# Patient Record
Sex: MALE | Race: WHITE | URBAN - METROPOLITAN AREA
[De-identification: names, ages, dates, MRNs, and addresses within clinical notes are randomized per-mention and may not be internally consistent; named-entity substitution may affect disease eponyms.]

---
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
## 2025-01-09 ENCOUNTER — APPOINTMENT (OUTPATIENT)
Dept: URBAN - METROPOLITAN AREA CLINIC 333 | Facility: CLINIC | Age: 20
Setting detail: DERMATOLOGY
End: 2025-01-09

## 2025-01-09 DIAGNOSIS — L72.8 OTHER FOLLICULAR CYSTS OF THE SKIN AND SUBCUTANEOUS TISSUE: ICD-10-CM

## 2025-01-09 PROCEDURE — ? COUNSELING

## 2025-01-09 PROCEDURE — ? ADDITIONAL NOTES

## 2025-01-09 PROCEDURE — 99212 OFFICE O/P EST SF 10 MIN: CPT

## 2025-01-09 ASSESSMENT — LOCATION DETAILED DESCRIPTION DERM
LOCATION DETAILED: LEFT INFERIOR POSTERIOR NECK
LOCATION DETAILED: LEFT LATERAL ZYGOMA

## 2025-01-09 ASSESSMENT — LOCATION ZONE DERM
LOCATION ZONE: NECK
LOCATION ZONE: FACE

## 2025-01-09 ASSESSMENT — LOCATION SIMPLE DESCRIPTION DERM
LOCATION SIMPLE: LEFT ZYGOMA
LOCATION SIMPLE: POSTERIOR NECK

## 2025-03-12 ENCOUNTER — LAB OUTSIDE AN ENCOUNTER (OUTPATIENT)
Dept: URBAN - METROPOLITAN AREA CLINIC 60 | Facility: CLINIC | Age: 20
End: 2025-03-12

## 2025-03-12 ENCOUNTER — DASHBOARD ENCOUNTERS (OUTPATIENT)
Age: 20
End: 2025-03-12

## 2025-03-12 ENCOUNTER — OFFICE VISIT (OUTPATIENT)
Dept: URBAN - METROPOLITAN AREA CLINIC 60 | Facility: CLINIC | Age: 20
End: 2025-03-12
Payer: COMMERCIAL

## 2025-03-12 VITALS
SYSTOLIC BLOOD PRESSURE: 120 MMHG | DIASTOLIC BLOOD PRESSURE: 80 MMHG | WEIGHT: 159 LBS | HEIGHT: 70 IN | BODY MASS INDEX: 22.76 KG/M2 | HEART RATE: 87 BPM | OXYGEN SATURATION: 99 % | TEMPERATURE: 97.8 F

## 2025-03-12 DIAGNOSIS — R19.8 IRREGULAR BOWEL HABITS: ICD-10-CM

## 2025-03-12 DIAGNOSIS — R14.3 EXCESSIVE FLATUS: ICD-10-CM

## 2025-03-12 PROCEDURE — 99213 OFFICE O/P EST LOW 20 MIN: CPT

## 2025-03-12 NOTE — HPI-TODAY'S VISIT:
Patient is a 19-year-old male, new patient to Dr. Worthington, complaining of 1 to 2 months of excessive gas and irregular bowel habits. Denies past medical history. Not currently on any medications.   He is currently in college in [place] and down for spring break.  He states around 1-1/2 months ago he noticed he had an increase in the amount of gas he has in a day as well as some of his stools looked different shapes than they typically were.  He had 1 instance of abdominal pain that resolved after taking a "digestive shot" that had supplements and some turmeric in it.  Has not had any abdominal pain since.  Bowel movements are daily, formed, brown without any sign of bleeding or pain.  Denies fever, chills, nausea, vomiting, reflux or dysphagia.  Appetite and weight are stable.  He has been meeting with [employer] who has advised him to cut out various things from his diet.  He did try limiting lactose but did not do a complete lactose-free diet but did not notice much of a difference.  He additionally stopped his protein powders and supplements for a few weeks without much difference in symptoms.  No prior endoscopies.  Negative family history for GI malignancy.

## 2025-04-23 ENCOUNTER — TELEPHONE ENCOUNTER (OUTPATIENT)
Dept: URBAN - METROPOLITAN AREA CLINIC 60 | Facility: CLINIC | Age: 20
End: 2025-04-23

## 2025-05-06 ENCOUNTER — OFFICE VISIT (OUTPATIENT)
Dept: URBAN - METROPOLITAN AREA CLINIC 60 | Facility: CLINIC | Age: 20
End: 2025-05-06
Payer: COMMERCIAL

## 2025-05-06 DIAGNOSIS — R19.8 IRREGULAR BOWEL HABITS: ICD-10-CM

## 2025-05-06 DIAGNOSIS — K21.9 GERD WITHOUT ESOPHAGITIS: ICD-10-CM

## 2025-05-06 DIAGNOSIS — R14.3 EXCESSIVE FLATUS: ICD-10-CM

## 2025-05-06 PROBLEM — 266435005: Status: ACTIVE | Noted: 2025-05-06

## 2025-05-06 PROCEDURE — 99214 OFFICE O/P EST MOD 30 MIN: CPT

## 2025-05-06 RX ORDER — FAMOTIDINE 40 MG/1
1 TABLET TABLET, FILM COATED ORAL ONCE A DAY
Qty: 30 | Refills: 3 | OUTPATIENT
Start: 2025-05-06

## 2025-05-06 NOTE — HPI-PREVIOUS IMAGING
Right upper quadrant abdominal ultrasound 4/1/2025 noted liver grossly normal size and echotexture, small amount of gallbladder sludge, visualized portions of pancreas unremarkable

## 2025-05-06 NOTE — HPI-TODAY'S VISIT:
Patient coming in for labs and imaging follow-up visit.  We reviewed his labs and ultrasound which were unremarkable.  His previous symptom of excessive flatus has improved now typically having a bit of flatus prior to BM in the morning and then again at nighttime.  BMs are mostly regular and denies diarrhea or constipation.  No sign of GI bleeding.  New symptom of acid reflux over the past few weeks other associated symptoms including unintentional weight loss, nausea, vomiting, dysphagia.  Last OV 3/12/2025: Patient is a 19-year-old male, new patient to Dr. Worthington, complaining of 1 to 2 months of excessive gas and irregular bowel habits. Denies past medical history. Not currently on any medications.   He is currently in college in [place] and down for spring break.  He states around 1-1/2 months ago he noticed he had an increase in the amount of gas he has in a day as well as some of his stools looked different shapes than they typically were.  He had 1 instance of abdominal pain that resolved after taking a "digestive shot" that had supplements and some turmeric in it.  Has not had any abdominal pain since.  Bowel movements are daily, formed, brown without any sign of bleeding or pain.  Denies fever, chills, nausea, vomiting, reflux or dysphagia.  Appetite and weight are stable.  He has been meeting with [facility] who has advised him to cut out various things from his diet.  He did try limiting lactose but did not do a complete lactose-free diet but did not notice much of a difference.  He additionally stopped his protein powders and supplements for a few weeks without much difference in symptoms.  No prior endoscopies.  Negative family history for GI malignancy.

## 2025-05-21 ENCOUNTER — OFFICE VISIT (OUTPATIENT)
Dept: URBAN - METROPOLITAN AREA CLINIC 60 | Facility: CLINIC | Age: 20
End: 2025-05-21

## 2025-05-21 LAB — RESULT:: (no result)

## 2025-05-23 ENCOUNTER — TELEPHONE ENCOUNTER (OUTPATIENT)
Dept: URBAN - METROPOLITAN AREA CLINIC 60 | Facility: CLINIC | Age: 20
End: 2025-05-23

## 2025-06-06 ENCOUNTER — OFFICE VISIT (OUTPATIENT)
Dept: URBAN - METROPOLITAN AREA CLINIC 60 | Facility: CLINIC | Age: 20
End: 2025-06-06
Payer: COMMERCIAL

## 2025-06-06 DIAGNOSIS — R19.8 IRREGULAR BOWEL HABITS: ICD-10-CM

## 2025-06-06 DIAGNOSIS — K21.9 GERD WITHOUT ESOPHAGITIS: ICD-10-CM

## 2025-06-06 DIAGNOSIS — R14.3 EXCESSIVE FLATUS: ICD-10-CM

## 2025-06-06 PROCEDURE — 99214 OFFICE O/P EST MOD 30 MIN: CPT

## 2025-06-06 RX ORDER — FAMOTIDINE 40 MG/1
1 TABLET TABLET, FILM COATED ORAL ONCE A DAY
Qty: 30 | Refills: 3 | Status: ACTIVE | COMMUNITY
Start: 2025-05-06

## 2025-06-06 RX ORDER — OMEPRAZOLE 20 MG/1
1 CAPSULE 1/2 TO 1 HOUR BEFORE MORNING MEAL CAPSULE, DELAYED RELEASE ORAL ONCE A DAY
Qty: 30 | Refills: 3 | OUTPATIENT
Start: 2025-06-06

## 2025-06-06 NOTE — HPI-PREVIOUS LABS
Labs 5/21/2025 H. pylori negative  Labs 3/31/2025 Celiac serologies negative CBC unremarkable CMP unremarkable

## 2025-06-06 NOTE — HPI-TODAY'S VISIT:
Patient coming in for 6-week follow-up visit.  H. pylori stool antigen that was ordered at last visit negative.  He did not notice much of a difference with his belching symptoms on the famotidine 40 mg.  He reports he still having intermittent belching as well as feeling of regurgitation but without burning.  We will increase therapy to omeprazole 20 mg once daily.  He still continues to complain of excessive flatus before BM relieved with BM.  Occasional looser stool every few weeks.  He did previously try a lactose-free diet and felt that he did not notice much of a difference but does admit at today's visit that he had 2 episodes of the looser stools and on those days he had consumed cheesy items.  I advised him to start taking Lactaid to see if those improve his symptoms and keep a food diary.  He will additionally add fiber to his diet as he is a  and mostly consumes high-protein foods.  OV 5/2025: Patient coming in for labs and imaging follow-up visit.  We reviewed his labs and ultrasound which were unremarkable.  His previous symptom of excessive flatus has improved now typically having a bit of flatus prior to BM in the morning and then again at nighttime.  BMs are mostly regular and denies diarrhea or constipation.  No sign of GI bleeding.  New symptom of acid reflux over the past few weeks other associated symptoms including unintentional weight loss, nausea, vomiting, dysphagia.  Last OV 3/12/2025: Patient is a 19-year-old male, new patient to Dr. Worthington, complaining of 1 to 2 months of excessive gas and irregular bowel habits. Denies past medical history. Not currently on any medications.   He is currently in college in [place] and down for spring break.  He states around 1-1/2 months ago he noticed he had an increase in the amount of gas he has in a day as well as some of his stools looked different shapes than they typically were.  He had 1 instance of abdominal pain that resolved after taking a "digestive shot" that had supplements and some turmeric in it.  Has not had any abdominal pain since.  Bowel movements are daily, formed, brown without any sign of bleeding or pain.  Denies fever, chills, nausea, vomiting, reflux or dysphagia.  Appetite and weight are stable.  He has been meeting with [employer] who has advised him to cut out various things from his diet.  He did try limiting lactose but did not do a complete lactose-free diet but did not notice much of a difference.  He additionally stopped his protein powders and supplements for a few weeks without much difference in symptoms.  No prior endoscopies.  Negative family history for GI malignancy.

## 2025-07-14 ENCOUNTER — ERX REFILL RESPONSE (OUTPATIENT)
Dept: URBAN - METROPOLITAN AREA CLINIC 60 | Facility: CLINIC | Age: 20
End: 2025-07-14

## 2025-07-14 RX ORDER — FAMOTIDINE 40 MG/1
TAKE 1 TABLET BY MOUTH EVERY DAY TABLET ORAL
Qty: 90 TABLET | Refills: 2 | OUTPATIENT

## 2025-07-14 RX ORDER — FAMOTIDINE 40 MG/1
1 TABLET TABLET, FILM COATED ORAL ONCE A DAY
Qty: 30 | Refills: 3 | OUTPATIENT

## 2025-07-28 ENCOUNTER — OFFICE VISIT (OUTPATIENT)
Dept: URBAN - METROPOLITAN AREA CLINIC 60 | Facility: CLINIC | Age: 20
End: 2025-07-28